# Patient Record
Sex: FEMALE | Race: ASIAN | NOT HISPANIC OR LATINO
[De-identification: names, ages, dates, MRNs, and addresses within clinical notes are randomized per-mention and may not be internally consistent; named-entity substitution may affect disease eponyms.]

---

## 2017-04-25 ENCOUNTER — TRANSCRIPTION ENCOUNTER (OUTPATIENT)
Age: 32
End: 2017-04-25

## 2021-05-13 ENCOUNTER — NON-APPOINTMENT (OUTPATIENT)
Age: 36
End: 2021-05-13

## 2021-05-13 PROBLEM — Z00.00 ENCOUNTER FOR PREVENTIVE HEALTH EXAMINATION: Status: ACTIVE | Noted: 2021-05-13

## 2021-05-20 ENCOUNTER — APPOINTMENT (OUTPATIENT)
Dept: OBGYN | Facility: CLINIC | Age: 36
End: 2021-05-20
Payer: COMMERCIAL

## 2021-05-20 VITALS
WEIGHT: 163 LBS | BODY MASS INDEX: 27.83 KG/M2 | HEART RATE: 90 BPM | SYSTOLIC BLOOD PRESSURE: 123 MMHG | HEIGHT: 64 IN | DIASTOLIC BLOOD PRESSURE: 86 MMHG

## 2021-05-20 DIAGNOSIS — Z78.9 OTHER SPECIFIED HEALTH STATUS: ICD-10-CM

## 2021-05-20 DIAGNOSIS — Z83.3 FAMILY HISTORY OF DIABETES MELLITUS: ICD-10-CM

## 2021-05-20 PROCEDURE — 99204 OFFICE O/P NEW MOD 45 MIN: CPT

## 2021-05-20 PROCEDURE — 99072 ADDL SUPL MATRL&STAF TM PHE: CPT

## 2021-05-25 NOTE — PHYSICAL EXAM
[FreeTextEntry7] : Ut ~ 28 wks [Labia Majora] : normal [Normal] : normal [FreeTextEntry6] : Ut 28 wks, bulky, mobile

## 2021-05-25 NOTE — DISCUSSION/SUMMARY
[FreeTextEntry1] : 34 yo P0 w/symptomatic ut myomas.\par Pt interested in conservative surgical intervention.\par \par Plan\par Schedule abd enoc (vertical?)

## 2021-05-25 NOTE — HISTORY OF PRESENT ILLNESS
[FreeTextEntry1] : 36 yo P0\par Pt here for eval of ut myomas.\par Pt noticed fibroids at the end of last year after noticing abd mass.\par Pt had USG and MRI showing fibroids.\par \par Pt has noticed early satiety.\par No urinary frequency, no nocturia, no const, diarrhea.\par No change in menses. No HMB\par \par Pt interested in preserving uterus.\par Here for second opinion.\par

## 2021-07-19 ENCOUNTER — OUTPATIENT (OUTPATIENT)
Dept: OUTPATIENT SERVICES | Facility: HOSPITAL | Age: 36
LOS: 1 days | End: 2021-07-19

## 2021-07-19 VITALS
HEART RATE: 76 BPM | TEMPERATURE: 97 F | OXYGEN SATURATION: 98 % | SYSTOLIC BLOOD PRESSURE: 118 MMHG | HEIGHT: 64 IN | DIASTOLIC BLOOD PRESSURE: 78 MMHG | WEIGHT: 164.91 LBS | RESPIRATION RATE: 16 BRPM

## 2021-07-19 DIAGNOSIS — D25.9 LEIOMYOMA OF UTERUS, UNSPECIFIED: ICD-10-CM

## 2021-07-19 LAB
BLD GP AB SCN SERPL QL: NEGATIVE — SIGNIFICANT CHANGE UP
HCG UR QL: NEGATIVE — SIGNIFICANT CHANGE UP
HCT VFR BLD CALC: 44.2 % — SIGNIFICANT CHANGE UP (ref 34.5–45)
HGB BLD-MCNC: 14.7 G/DL — SIGNIFICANT CHANGE UP (ref 11.5–15.5)
MCHC RBC-ENTMCNC: 29.4 PG — SIGNIFICANT CHANGE UP (ref 27–34)
MCHC RBC-ENTMCNC: 33.3 GM/DL — SIGNIFICANT CHANGE UP (ref 32–36)
MCV RBC AUTO: 88.4 FL — SIGNIFICANT CHANGE UP (ref 80–100)
NRBC # BLD: 0 /100 WBCS — SIGNIFICANT CHANGE UP
NRBC # FLD: 0 K/UL — SIGNIFICANT CHANGE UP
PLATELET # BLD AUTO: 273 K/UL — SIGNIFICANT CHANGE UP (ref 150–400)
RBC # BLD: 5 M/UL — SIGNIFICANT CHANGE UP (ref 3.8–5.2)
RBC # FLD: 12.5 % — SIGNIFICANT CHANGE UP (ref 10.3–14.5)
RH IG SCN BLD-IMP: NEGATIVE — SIGNIFICANT CHANGE UP
WBC # BLD: 7.52 K/UL — SIGNIFICANT CHANGE UP (ref 3.8–10.5)
WBC # FLD AUTO: 7.52 K/UL — SIGNIFICANT CHANGE UP (ref 3.8–10.5)

## 2021-07-19 RX ORDER — SODIUM CHLORIDE 9 MG/ML
1000 INJECTION, SOLUTION INTRAVENOUS
Refills: 0 | Status: DISCONTINUED | OUTPATIENT
Start: 2021-07-30 | End: 2021-07-30

## 2021-07-19 RX ORDER — SODIUM CHLORIDE 9 MG/ML
3 INJECTION INTRAMUSCULAR; INTRAVENOUS; SUBCUTANEOUS EVERY 8 HOURS
Refills: 0 | Status: DISCONTINUED | OUTPATIENT
Start: 2021-07-30 | End: 2021-08-01

## 2021-07-19 NOTE — H&P PST ADULT - HISTORY OF PRESENT ILLNESS
36 year old female with uterine fibroids which are causing some discomfort. Pt presents today for presurgical evaluation for .. 36 year old female with large uterine fibroids which are causing some discomfort. Pt presents today for presurgical evaluation for Abdominal Myomectomy.

## 2021-07-19 NOTE — H&P PST ADULT - NSANTHOSAYNRD_GEN_A_CORE
No. GREYSON screening performed.  STOP BANG Legend: 0-2 = LOW Risk; 3-4 = INTERMEDIATE Risk; 5-8 = HIGH Risk

## 2021-07-19 NOTE — H&P PST ADULT - NSICDXPROBLEM_GEN_ALL_CORE_FT
PROBLEM DIAGNOSES  Problem: Leiomyoma of uterus  Assessment and Plan: Pt scheduled for surgery on 7/30/2021.  Pre-op instructions provided. Pt verbalized understanding.   Pepcid provided for GI prophylaxis.   Pt given detailed verbal and written instructions on chlorhexidine wash. Pt verbalized understanding with teachback.   Pt given urine specimen cup for ucg on admission.

## 2021-07-27 ENCOUNTER — APPOINTMENT (OUTPATIENT)
Dept: OBGYN | Facility: CLINIC | Age: 36
End: 2021-07-27

## 2021-07-29 ENCOUNTER — TRANSCRIPTION ENCOUNTER (OUTPATIENT)
Age: 36
End: 2021-07-29

## 2021-07-30 ENCOUNTER — RESULT REVIEW (OUTPATIENT)
Age: 36
End: 2021-07-30

## 2021-07-30 ENCOUNTER — TRANSCRIPTION ENCOUNTER (OUTPATIENT)
Age: 36
End: 2021-07-30

## 2021-07-30 ENCOUNTER — APPOINTMENT (OUTPATIENT)
Dept: OBGYN | Facility: HOSPITAL | Age: 36
End: 2021-07-30

## 2021-07-30 ENCOUNTER — INPATIENT (INPATIENT)
Facility: HOSPITAL | Age: 36
LOS: 1 days | Discharge: HOME | End: 2021-08-01
Attending: OBSTETRICS & GYNECOLOGY | Admitting: OBSTETRICS & GYNECOLOGY
Payer: COMMERCIAL

## 2021-07-30 VITALS
HEART RATE: 74 BPM | OXYGEN SATURATION: 99 % | DIASTOLIC BLOOD PRESSURE: 75 MMHG | HEIGHT: 64 IN | SYSTOLIC BLOOD PRESSURE: 118 MMHG | WEIGHT: 164.91 LBS | TEMPERATURE: 99 F | RESPIRATION RATE: 16 BRPM

## 2021-07-30 DIAGNOSIS — D25.9 LEIOMYOMA OF UTERUS, UNSPECIFIED: ICD-10-CM

## 2021-07-30 LAB
HCG UR QL: NEGATIVE — SIGNIFICANT CHANGE UP
RH IG SCN BLD-IMP: NEGATIVE — SIGNIFICANT CHANGE UP

## 2021-07-30 PROCEDURE — 88305 TISSUE EXAM BY PATHOLOGIST: CPT | Mod: 26

## 2021-07-30 PROCEDURE — 58146 MYOMECTOMY ABDOM COMPLEX: CPT

## 2021-07-30 RX ORDER — ACETAMINOPHEN 500 MG
1000 TABLET ORAL ONCE
Refills: 0 | Status: COMPLETED | OUTPATIENT
Start: 2021-07-31 | End: 2021-07-31

## 2021-07-30 RX ORDER — OXYCODONE HYDROCHLORIDE 5 MG/1
1 TABLET ORAL
Qty: 5 | Refills: 0
Start: 2021-07-30

## 2021-07-30 RX ORDER — KETOROLAC TROMETHAMINE 30 MG/ML
30 SYRINGE (ML) INJECTION EVERY 8 HOURS
Refills: 0 | Status: DISCONTINUED | OUTPATIENT
Start: 2021-07-30 | End: 2021-07-31

## 2021-07-30 RX ORDER — ACETAMINOPHEN 500 MG
1000 TABLET ORAL EVERY 6 HOURS
Refills: 0 | Status: DISCONTINUED | OUTPATIENT
Start: 2021-07-30 | End: 2021-07-31

## 2021-07-30 RX ORDER — ACETAMINOPHEN 500 MG
1000 TABLET ORAL ONCE
Refills: 0 | Status: COMPLETED | OUTPATIENT
Start: 2021-07-30 | End: 2021-07-31

## 2021-07-30 RX ORDER — ONDANSETRON 8 MG/1
4 TABLET, FILM COATED ORAL ONCE
Refills: 0 | Status: COMPLETED | OUTPATIENT
Start: 2021-07-30 | End: 2021-07-31

## 2021-07-30 RX ORDER — ONDANSETRON 8 MG/1
8 TABLET, FILM COATED ORAL EVERY 8 HOURS
Refills: 0 | Status: DISCONTINUED | OUTPATIENT
Start: 2021-07-30 | End: 2021-08-01

## 2021-07-30 RX ORDER — HYDROMORPHONE HYDROCHLORIDE 2 MG/ML
0.5 INJECTION INTRAMUSCULAR; INTRAVENOUS; SUBCUTANEOUS
Refills: 0 | Status: DISCONTINUED | OUTPATIENT
Start: 2021-07-30 | End: 2021-07-31

## 2021-07-30 RX ORDER — SODIUM CHLORIDE 9 MG/ML
1000 INJECTION, SOLUTION INTRAVENOUS
Refills: 0 | Status: DISCONTINUED | OUTPATIENT
Start: 2021-07-30 | End: 2021-07-31

## 2021-07-30 RX ORDER — IBUPROFEN 200 MG
1 TABLET ORAL
Qty: 0 | Refills: 0 | DISCHARGE

## 2021-07-30 RX ORDER — ACETAMINOPHEN 500 MG
3 TABLET ORAL
Qty: 0 | Refills: 0 | DISCHARGE

## 2021-07-30 RX ORDER — OXYCODONE HYDROCHLORIDE 5 MG/1
5 TABLET ORAL ONCE
Refills: 0 | Status: DISCONTINUED | OUTPATIENT
Start: 2021-07-30 | End: 2021-07-31

## 2021-07-30 RX ORDER — SIMETHICONE 80 MG/1
80 TABLET, CHEWABLE ORAL EVERY 6 HOURS
Refills: 0 | Status: DISCONTINUED | OUTPATIENT
Start: 2021-07-30 | End: 2021-08-01

## 2021-07-30 RX ADMIN — SODIUM CHLORIDE 125 MILLILITER(S): 9 INJECTION, SOLUTION INTRAVENOUS at 21:25

## 2021-07-30 RX ADMIN — SODIUM CHLORIDE 3 MILLILITER(S): 9 INJECTION INTRAMUSCULAR; INTRAVENOUS; SUBCUTANEOUS at 21:48

## 2021-07-30 RX ADMIN — SODIUM CHLORIDE 30 MILLILITER(S): 9 INJECTION, SOLUTION INTRAVENOUS at 13:48

## 2021-07-30 RX ADMIN — HYDROMORPHONE HYDROCHLORIDE 0.5 MILLIGRAM(S): 2 INJECTION INTRAMUSCULAR; INTRAVENOUS; SUBCUTANEOUS at 22:36

## 2021-07-30 RX ADMIN — HYDROMORPHONE HYDROCHLORIDE 0.5 MILLIGRAM(S): 2 INJECTION INTRAMUSCULAR; INTRAVENOUS; SUBCUTANEOUS at 23:00

## 2021-07-30 NOTE — DISCHARGE NOTE PROVIDER - NSDCFUSCHEDAPPT_GEN_ALL_CORE_FT
ANGEL MILNER ; 07/30/2021 ; [facility] Surg [address]  ANGEL MILNER ; 08/26/2021 ; [facility] [address] [facility] ANGEL MILNER ; 08/26/2021 ; [facility] [id number] [facility]

## 2021-07-30 NOTE — ASU PREOP CHECKLIST - ORDERS/MEDICATION ADMINISTRATION RECORD ON CHART
Woke up pt to fix teley leads patient woke up very confused, combative, yelling in Cambodian and attempting to rip lines and drains out. Call daughter costa. Pt talked to daughter seemed to calm her down. MD camarena gave order for ativan and haldol prn. Agatation improved with ativan and was cooperative while daughter came in to calm her down. Will continue to monitor.    done

## 2021-07-30 NOTE — BRIEF OPERATIVE NOTE - NSICDXBRIEFPROCEDURE_GEN_ALL_CORE_FT
PROCEDURES:  Myomectomy, uterus, 5 or more leiomyomata, abdominal approach, total weight greater than 250 grams 30-Jul-2021 20:40:50  Tena Guardado

## 2021-07-30 NOTE — DISCHARGE NOTE PROVIDER - CARE PROVIDER_API CALL
Ezio Walter  OBSTETRICS AND GYNECOLOGY  [address] [place] [address]  Phone: [phone]  Fax: [phone]  Established Patient  Follow Up Time: 2 weeks

## 2021-07-30 NOTE — BRIEF OPERATIVE NOTE - OPERATION/FINDINGS
EUA: approximately 24 week sized bulky mobile fibroid uterus, adnexa non-enlarged  Laparotomy: globular fibroid uterus with dominant 17cm sized left lateral subserosal fibroids; multiple additional subserosal fibroids: 4cm left anterior, 3cm right anterior and multiple <1cm anterior subserosal fibroids. Dominant fibroid displacing uterus to the right and anteriorly. Normal appearing b/l fallopian tubes and ovaries.  normal appearing upper abdomen: smooth liver edge, no bowel irregularities or adhesions EUA: approximately 24 week sized bulky mobile fibroid uterus, adnexa non-enlarged  Laparotomy: globular fibroid uterus with dominant 17cm sized left lateral subserosal fibroid; multiple additional subserosal fibroids: 4cm left anterior, 3cm right anterior and multiple <1cm anterior subserosal fibroids. Dominant fibroid displacing uterus to the right and anteriorly. Normal appearing b/l fallopian tubes and ovaries.  normal appearing upper abdomen: smooth liver edge, no bowel irregularities or adhesions

## 2021-07-30 NOTE — DISCHARGE NOTE PROVIDER - HOSPITAL COURSE
37yo who presented on 7/30 for abdominal myomectomy for fibroid uterus. Pt underwent surgery without any complications. Pt underwent Ex-Lap, Abdominal Myomectomy (). Please see operative report for details of the case. On POD#1 cordova was discontinued and patient was able to void successfully. Pt tolerated regular diet and ambulated without difficulty. CBC was stable. On POD#2 pt was able to pass flatus. Pt was discharged on POD@2 in stable condition: ambulating, voiding, tolerating regular diet with pain well controlled. She will have close follow-up with Dr. Walter

## 2021-07-30 NOTE — DISCHARGE NOTE PROVIDER - NSDCFUADDINST_GEN_ALL_CORE_FT
Follow up with Dr. Walter. Take pain medications as instructed. Call your doctor if you experience fevers >100.4, pain uncontrolled with medications, heavy vaginal bleeding, or inability to void. Return to your regular way of eating.  Resume normal activity as tolerated, but no heavy lifting or strenuous activity for 2 weeks.  No driving for next 2 weeks and/or while on narcotic pain medication.  Complete vaginal rest, no tampons, no douching, no tub bathing, no sexual activities for 2 weeks unless otherwise instructed by your doctor.  Call your doctor with any signs and symptoms of infection such as fever, chills, nausea or vomiting.  Call your doctor with redness or swelling at the incision site, fluid leakage or wound separation.  Call your doctor if you're unable to tolerate food or have difficulty urinating.  Call your doctor if you have pain that is not relieved by your prescribed medications.  Notify your doctor with any other concerns.  Follow up with Dr. Walter in 2 weeks

## 2021-07-30 NOTE — CHART NOTE - NSCHARTNOTEFT_GEN_A_CORE
R2 GYN POST-OP CHECK    S: Patient seen and evaluated at bedside.  Pt awake and alert resting comfortably in bed. Patient reports pain controlled with analgesia. Pt denies N/V, SOB, CP, palpitations, fever/chills. Tolerating clears.  Not OOB yet.    O:   T(C): 36.7 (07-30-21 @ 20:55), Max: 36.7 (07-30-21 @ 20:55)  HR: 84 (07-30-21 @ 22:15) (79 - 84)  BP: 126/85 (07-30-21 @ 22:15) (126/74 - 135/85)  RR: 20 (07-30-21 @ 22:15) (13 - 24)  SpO2: 94% (07-30-21 @ 22:15) (94% - 100%)  Wt(kg): --  I&O's Summary      Gen: Resting comfortably in bed, NAD  CV: S1S2, RRR  Lungs: CTA B/L  Abd: soft, appropriately tender, occasional BS x 4 quadrants.    Inc: Pfannenstiel Clean/dry/intact w/ dermabond  Ext: SCD's in place and functional, non-tender b/l, no edema    Labs:      MEDICATIONS  (STANDING):  acetaminophen   Tablet .. 1000 milliGRAM(s) Oral every 6 hours  acetaminophen  IVPB .. 1000 milliGRAM(s) IV Intermittent once  ketorolac   Injectable 30 milliGRAM(s) IV Push every 8 hours  lactated ringers. 1000 milliLiter(s) (125 mL/Hr) IV Continuous <Continuous>  sodium chloride 0.9% lock flush 3 milliLiter(s) IV Push every 8 hours    MEDICATIONS  (PRN):  HYDROmorphone  Injectable 0.5 milliGRAM(s) IV Push every 10 minutes PRN Severe Pain (7 - 10)  ondansetron    Tablet 8 milliGRAM(s) Oral every 8 hours PRN Nausea and/or Vomiting  ondansetron Injectable 4 milliGRAM(s) IV Push once PRN Nausea and/or Vomiting  oxyCODONE    IR 5 milliGRAM(s) Oral once PRN Moderate Pain (4 - 6)  simethicone 80 milliGRAM(s) Chew every 6 hours PRN Gas        A/P: 36y Female s/p ex-lap, abdominal myomectomy  stable in the PACU    Neuro: PO Analgesia PRN     CV: Hemodynamically stable.  Monitor VS. CBC in AM.   Pulm: Saturating well on room air.  Encourage OOB and incentive spirometer use.   GI: Advance to regular diet. Anti-emetics PRN.  : Riley to gravity.    FEN: Electrolytes: LR@125cc/hr.    Heme: DVT ppx w/ SCD's while in bed. Early ambulation, initially with assistance then as tolerated.    ID: Afebrile  Endo: No active issues   Dispo: Discharge from PACU when criteria met.     d/w Dr. Walter and Dr. Morgan Porter, PGY2

## 2021-07-30 NOTE — DISCHARGE NOTE PROVIDER - NSDCMRMEDTOKEN_GEN_ALL_CORE_FT
oxyCODONE 5 mg oral tablet: 1 tab(s) orally every 6 hours, As Needed -for severe pain MDD:4    ibuprofen 600 mg oral tablet: 1 tab(s) orally every 6 hours  oxyCODONE 5 mg oral tablet: 1 tab(s) orally every 6 hours, As Needed -for severe pain MDD:4   Tylenol 325 mg oral capsule: 3 cap(s) orally every 6 hours

## 2021-07-31 LAB
HCT VFR BLD CALC: 39.5 % — SIGNIFICANT CHANGE UP (ref 34.5–45)
HGB BLD-MCNC: 13.2 G/DL — SIGNIFICANT CHANGE UP (ref 11.5–15.5)
MCHC RBC-ENTMCNC: 29.4 PG — SIGNIFICANT CHANGE UP (ref 27–34)
MCHC RBC-ENTMCNC: 33.4 GM/DL — SIGNIFICANT CHANGE UP (ref 32–36)
MCV RBC AUTO: 88 FL — SIGNIFICANT CHANGE UP (ref 80–100)
NRBC # BLD: 0 /100 WBCS — SIGNIFICANT CHANGE UP
NRBC # FLD: 0 K/UL — SIGNIFICANT CHANGE UP
PLATELET # BLD AUTO: 281 K/UL — SIGNIFICANT CHANGE UP (ref 150–400)
RBC # BLD: 4.49 M/UL — SIGNIFICANT CHANGE UP (ref 3.8–5.2)
RBC # FLD: 12.4 % — SIGNIFICANT CHANGE UP (ref 10.3–14.5)
WBC # BLD: 18.02 K/UL — HIGH (ref 3.8–10.5)
WBC # FLD AUTO: 18.02 K/UL — HIGH (ref 3.8–10.5)

## 2021-07-31 RX ORDER — KETOROLAC TROMETHAMINE 30 MG/ML
30 SYRINGE (ML) INJECTION EVERY 6 HOURS
Refills: 0 | Status: DISCONTINUED | OUTPATIENT
Start: 2021-07-31 | End: 2021-08-01

## 2021-07-31 RX ORDER — FAMOTIDINE 10 MG/ML
20 INJECTION INTRAVENOUS ONCE
Refills: 0 | Status: COMPLETED | OUTPATIENT
Start: 2021-07-31 | End: 2021-07-31

## 2021-07-31 RX ORDER — OXYCODONE HYDROCHLORIDE 5 MG/1
5 TABLET ORAL ONCE
Refills: 0 | Status: DISCONTINUED | OUTPATIENT
Start: 2021-07-31 | End: 2021-07-31

## 2021-07-31 RX ORDER — ACETAMINOPHEN 500 MG
975 TABLET ORAL EVERY 6 HOURS
Refills: 0 | Status: DISCONTINUED | OUTPATIENT
Start: 2021-07-31 | End: 2021-08-01

## 2021-07-31 RX ADMIN — Medication 400 MILLIGRAM(S): at 00:57

## 2021-07-31 RX ADMIN — ONDANSETRON 4 MILLIGRAM(S): 8 TABLET, FILM COATED ORAL at 01:35

## 2021-07-31 RX ADMIN — Medication 30 MILLIGRAM(S): at 03:26

## 2021-07-31 RX ADMIN — Medication 975 MILLIGRAM(S): at 19:06

## 2021-07-31 RX ADMIN — Medication 400 MILLIGRAM(S): at 06:32

## 2021-07-31 RX ADMIN — FAMOTIDINE 20 MILLIGRAM(S): 10 INJECTION INTRAVENOUS at 06:33

## 2021-07-31 RX ADMIN — Medication 400 MILLIGRAM(S): at 12:02

## 2021-07-31 RX ADMIN — OXYCODONE HYDROCHLORIDE 5 MILLIGRAM(S): 5 TABLET ORAL at 04:45

## 2021-07-31 RX ADMIN — OXYCODONE HYDROCHLORIDE 5 MILLIGRAM(S): 5 TABLET ORAL at 04:04

## 2021-07-31 RX ADMIN — SODIUM CHLORIDE 3 MILLILITER(S): 9 INJECTION INTRAMUSCULAR; INTRAVENOUS; SUBCUTANEOUS at 06:28

## 2021-07-31 RX ADMIN — SODIUM CHLORIDE 3 MILLILITER(S): 9 INJECTION INTRAMUSCULAR; INTRAVENOUS; SUBCUTANEOUS at 21:03

## 2021-07-31 RX ADMIN — SODIUM CHLORIDE 3 MILLILITER(S): 9 INJECTION INTRAMUSCULAR; INTRAVENOUS; SUBCUTANEOUS at 12:11

## 2021-07-31 RX ADMIN — SODIUM CHLORIDE 125 MILLILITER(S): 9 INJECTION, SOLUTION INTRAVENOUS at 03:26

## 2021-07-31 NOTE — PROGRESS NOTE ADULT - SUBJECTIVE AND OBJECTIVE BOX
R1 GYN Progress Note     Patient seen and examined at bedside.  No acute events overnight. No acute complaints.  Pain well controlled.  Patient is ambulating and tolerating.....   Has not yet passed flatus vs. Patient is passing flatus.    Patient is voiding spontaneously vs. Riley is still in place.   Denies CP, SOB, N/V, fevers, and chills.    Vital Signs Last 24 Hours  T(C): 36.7 (07-31-21 @ 03:48), Max: 37.1 (07-30-21 @ 13:16)  HR: 76 (07-31-21 @ 03:48) (74 - 89)  BP: 130/79 (07-31-21 @ 03:48) (118/75 - 135/85)  RR: 24 (07-31-21 @ 03:48) (13 - 24)  SpO2: 99% (07-31-21 @ 03:48) (94% - 100%)    I&O's Summary    30 Jul 2021 07:01  -  31 Jul 2021 04:51  --------------------------------------------------------  IN: 435 mL / OUT: 320 mL / NET: 115 mL        Physical Exam:  General: NAD  CV: RRR  Lungs: CTA b/l, good air flow b/l   Abdomen: Soft, mildly-tender to palpation diffusely, softly distended, normoactive bowel sounds  Incision:  Pfannenstiel Clean/dry/intact w/ dermabond  Ext: No pain or swelling     Labs:      MEDICATIONS  (STANDING):  acetaminophen   Tablet .. 1000 milliGRAM(s) Oral every 6 hours  acetaminophen  IVPB .. 1000 milliGRAM(s) IV Intermittent once  acetaminophen  IVPB .. 1000 milliGRAM(s) IV Intermittent once  famotidine Injectable 20 milliGRAM(s) IV Push once  ketorolac   Injectable 30 milliGRAM(s) IV Push every 8 hours  lactated ringers. 1000 milliLiter(s) (125 mL/Hr) IV Continuous <Continuous>  sodium chloride 0.9% lock flush 3 milliLiter(s) IV Push every 8 hours    MEDICATIONS  (PRN):  ondansetron    Tablet 8 milliGRAM(s) Oral every 8 hours PRN Nausea and/or Vomiting  simethicone 80 milliGRAM(s) Chew every 6 hours PRN Gas       R1 GYN Progress Note     Patient seen and examined at bedside.  No acute events overnight. No acute complaints.  Pain well controlled.  Patient is ambulating and tolerating clears  Has not yet passed flatus   Riley is still in place.   Denies CP, SOB, N/V, fevers, and chills.    Vital Signs Last 24 Hours  T(C): 36.7 (07-31-21 @ 03:48), Max: 37.1 (07-30-21 @ 13:16)  HR: 76 (07-31-21 @ 03:48) (74 - 89)  BP: 130/79 (07-31-21 @ 03:48) (118/75 - 135/85)  RR: 24 (07-31-21 @ 03:48) (13 - 24)  SpO2: 99% (07-31-21 @ 03:48) (94% - 100%)    I&O's Summary    30 Jul 2021 07:01  -  31 Jul 2021 04:51  --------------------------------------------------------  IN: 435 mL / OUT: 320 mL / NET: 115 mL        Physical Exam:  General: NAD  CV: RRR  Lungs: CTA b/l, good air flow b/l   Abdomen: Soft, mildly-tender to palpation diffusely, softly distended, normoactive bowel sounds  Incision:  Pfannenstiel Clean/dry/intact w/ dermabond  Ext: No pain or swelling     Labs:      MEDICATIONS  (STANDING):  acetaminophen   Tablet .. 1000 milliGRAM(s) Oral every 6 hours  acetaminophen  IVPB .. 1000 milliGRAM(s) IV Intermittent once  acetaminophen  IVPB .. 1000 milliGRAM(s) IV Intermittent once  famotidine Injectable 20 milliGRAM(s) IV Push once  ketorolac   Injectable 30 milliGRAM(s) IV Push every 8 hours  lactated ringers. 1000 milliLiter(s) (125 mL/Hr) IV Continuous <Continuous>  sodium chloride 0.9% lock flush 3 milliLiter(s) IV Push every 8 hours    MEDICATIONS  (PRN):  ondansetron    Tablet 8 milliGRAM(s) Oral every 8 hours PRN Nausea and/or Vomiting  simethicone 80 milliGRAM(s) Chew every 6 hours PRN Gas       R2 GYN Progress Note     Patient seen and examined at bedside.  No acute events overnight. No acute complaints.  Pain well controlled.  Patient is ambulating and tolerating clears  Has not yet passed flatus   Riley is still in place.   Denies CP, SOB, N/V, fevers, and chills.    Vital Signs Last 24 Hours  T(C): 36.7 (07-31-21 @ 03:48), Max: 37.1 (07-30-21 @ 13:16)  HR: 76 (07-31-21 @ 03:48) (74 - 89)  BP: 130/79 (07-31-21 @ 03:48) (118/75 - 135/85)  RR: 24 (07-31-21 @ 03:48) (13 - 24)  SpO2: 99% (07-31-21 @ 03:48) (94% - 100%)    I&O's Summary    30 Jul 2021 07:01  -  31 Jul 2021 04:51  --------------------------------------------------------  IN: 435 mL / OUT: 320 mL / NET: 115 mL        Physical Exam:  General: NAD  CV: RRR  Lungs: CTA b/l, good air flow b/l   Abdomen: Soft, mildly-tender to palpation diffusely, softly distended, normoactive bowel sounds  Incision:  Pfannenstiel Clean/dry/intact w/ dermabond  Ext: No pain or swelling     Labs:      MEDICATIONS  (STANDING):  acetaminophen   Tablet .. 1000 milliGRAM(s) Oral every 6 hours  acetaminophen  IVPB .. 1000 milliGRAM(s) IV Intermittent once  acetaminophen  IVPB .. 1000 milliGRAM(s) IV Intermittent once  famotidine Injectable 20 milliGRAM(s) IV Push once  ketorolac   Injectable 30 milliGRAM(s) IV Push every 8 hours  lactated ringers. 1000 milliLiter(s) (125 mL/Hr) IV Continuous <Continuous>  sodium chloride 0.9% lock flush 3 milliLiter(s) IV Push every 8 hours    MEDICATIONS  (PRN):  ondansetron    Tablet 8 milliGRAM(s) Oral every 8 hours PRN Nausea and/or Vomiting  simethicone 80 milliGRAM(s) Chew every 6 hours PRN Gas

## 2021-07-31 NOTE — PROVIDER CONTACT NOTE (OTHER) - ASSESSMENT
Patient complains of 8/10 pain. Vital sign stable except RR 24. See flowsheet for details. Patient complains of chest tightness.

## 2021-07-31 NOTE — PROGRESS NOTE ADULT - ASSESSMENT
36y F POD#1 s/p ex-lap, abdominal myomectomy. . Pt beginning to meet postoperative milestones with no acute concerns.     Neuro: Pain well controlled on IV Tylenol and Toradol      CV: Hemodynamically stable.  Monitor VS. f/u AM CBC   Pulm: Saturating well on room air.  Encourage OOB and incentive spirometer use.    GI: Regular diet. Anti-emetics PRN.   : Cordova in place. Adequate UOP. D/C cordova.   Heme: Continue HSQ/Venodynes for DVT ppx. Increase OOB.   FEN: LR@125. f/u AM BMP, Mg, Phos. Replete electrolytes PRN     ID: afebrile   Endo: no active issues  Dispo: continue routine postoperative care     Roldan PGY2 36y F POD#1 s/p ex-lap, abdominal myomectomy. . Pt beginning to meet postoperative milestones with no acute concerns.     Neuro: Pain well controlled on IV Tylenol and Toradol      CV: Hemodynamically stable.  Monitor VS. f/u AM CBC   Pulm: Saturating well on room air.  Encourage OOB and incentive spirometer use.    GI: Regular diet. Anti-emetics PRN.   : Cordova in place. Adequate UOP. D/C cordova.   Heme: Continue Venodynes for DVT ppx. Increase OOB.   FEN: LR@125. f/u AM CBC     ID: afebrile   Endo: no active issues  Dispo: continue routine postoperative care     Roldan PGY2      Fellow Note    Pt evaluated at bedside. No acute overnight events. Pt is now POD#1 s/p Abd myomectomy (EBL=250ml) Pt has met all appropriate post-op milestones. She is tolerating a reg diet. Cordova catheter was removed and now pt is voiding spontaneously. Pt required 5mg oxycodone x1 overnight- otherwise pain controlled with standing acetaminophen and toradol. Patient denies chest pain, shortness of breath, fevers, chills, nausea, vomiting, diarrhea. Pfannenstiel incision is c/d/i.    -AM CBC reviewed: 13.2/39.5. WBC noted to be 18, however pt is recently post-op. Pt has been afebrile  -Reg diet as tolerated. Will transition to po medications after pt able to tolerating meals  -Venodyne and ambulation for DVT prophylaxis. Pt has been ambulating numerous times this AM.   -Routine post-op care    Jolene, pgy5

## 2021-08-01 ENCOUNTER — TRANSCRIPTION ENCOUNTER (OUTPATIENT)
Age: 36
End: 2021-08-01

## 2021-08-01 VITALS
OXYGEN SATURATION: 100 % | RESPIRATION RATE: 18 BRPM | HEART RATE: 80 BPM | TEMPERATURE: 98 F | SYSTOLIC BLOOD PRESSURE: 100 MMHG | DIASTOLIC BLOOD PRESSURE: 67 MMHG

## 2021-08-01 RX ORDER — IBUPROFEN 200 MG
600 TABLET ORAL EVERY 6 HOURS
Refills: 0 | Status: DISCONTINUED | OUTPATIENT
Start: 2021-08-01 | End: 2021-08-01

## 2021-08-01 RX ADMIN — Medication 975 MILLIGRAM(S): at 01:00

## 2021-08-01 RX ADMIN — Medication 975 MILLIGRAM(S): at 07:06

## 2021-08-01 RX ADMIN — SODIUM CHLORIDE 3 MILLILITER(S): 9 INJECTION INTRAMUSCULAR; INTRAVENOUS; SUBCUTANEOUS at 07:15

## 2021-08-01 NOTE — DISCHARGE NOTE NURSING/CASE MANAGEMENT/SOCIAL WORK - PATIENT PORTAL LINK FT
You can access the FollowMyHealth Patient Portal offered by [facility] by registering at the following website: [email or web address]. By joining [facility]’s FollowMyHealth portal, you will also be able to view your health information using other applications (apps) compatible with our system.

## 2021-08-01 NOTE — PROGRESS NOTE ADULT - ASSESSMENT
36y F POD#2 s/p ex-lap, abdominal myomectomy. . Pt beginning to meet postoperative milestones with no acute concerns.     Neuro: Pain well controlled on PO Tylenol and Toradol      CV: Hemodynamically stable.  H/H on yesterdays labs appropriate. Monitor VS. No labs in AM.  Pulm: Saturating well on room air.  Encourage OOB and incentive spirometer use.    GI: Regular diet. Anti-emetics PRN.   : Voiding spontaneously. Adequate UOP.   Heme: Continue Venodynes for DVT ppx. Increase OOB.   FEN: SLIV  ID: afebrile   Endo: no active issues  Dispo: continue routine postoperative care     Roldan PGY2       36y F POD#2 s/p ex-lap, abdominal myomectomy. . Pt meeting postoperative milestones with no acute concerns.     Neuro: Pain well controlled on PO Tylenol and Toradol      CV: Hemodynamically stable.  H/H on yesterdays labs appropriate. Monitor VS. No labs in AM.  Pulm: Saturating well on room air.  Encourage OOB and incentive spirometer use.    GI: Regular diet. Anti-emetics PRN.   : Voiding spontaneously. Adequate UOP.   Heme: Continue Venodynes for DVT ppx. Increase OOB.   FEN: SLIV  ID: afebrile   Endo: no active issues  Dispo: continue routine postoperative care     Roldan PGY2       36y F POD#2 s/p ex-lap, abdominal myomectomy. . Pt meeting postoperative milestones with no acute concerns.     Neuro: Pain well controlled on PO Tylenol and Toradol. Will transition to Motrin today      CV: Hemodynamically stable.  H/H on yesterdays labs appropriate. Monitor VS. No labs in AM.  Pulm: Saturating well on room air.  Encourage OOB and incentive spirometer use.    GI: Regular diet. Anti-emetics PRN.   : Voiding spontaneously. Adequate UOP.   Heme: Continue Venodynes for DVT ppx. Increase OOB.   FEN: SLIV  ID: afebrile   Endo: no active issues  Dispo: discharge planning    Roldan PGY2       36y F POD#2 s/p ex-lap, abdominal myomectomy. . Pt meeting postoperative milestones with no acute concerns.     Neuro: Pain well controlled on PO Tylenol and Toradol. Will transition to Motrin today      CV: Hemodynamically stable.  H/H on yesterdays labs appropriate. Monitor VS. No labs in AM.  Pulm: Saturating well on room air.  Encourage OOB and incentive spirometer use.    GI: Regular diet. Anti-emetics PRN.   : Voiding spontaneously. Adequate UOP.   Heme: Continue Venodynes for DVT ppx. Increase OOB.   FEN: SLIV  ID: afebrile   Endo: no active issues  Dispo: discharge planning    Roldan PGY2    Fellow Note  Pt evaluated at bedside with Dr. Ortiz. No acute overnight events. Pt is now POD#2 s/p abd myomectomy. Pt has met all appropriate post-op milestones. Pt tolerating reg diet. Passing flatus. Voiding spontaneously. Pt ambulating in hallways.  Patient denies headaches, blurry vision, lightheadness, dizziness, CP, SOB, fevers, chills, nausea, vomiting, diarrhea, constipation. Incision c/d/i. Pain well controlled with po pain medications. Pt to be discharged later today.   Jolene, pgy-5

## 2021-08-01 NOTE — PROGRESS NOTE ADULT - SUBJECTIVE AND OBJECTIVE BOX
R2 GYN Progress Note     Patient seen and examined at bedside.  No acute events overnight. No acute complaints.  Pain well controlled.  Patient is ambulating and tolerating regular diet  Has not yet passed flatus. Voiding spontaneously  Denies CP, SOB, N/V, fevers, and chills.    Vital Signs Last 24 Hrs  T(C): 36.8 (01 Aug 2021 02:00), Max: 37.1 (31 Jul 2021 14:09)  T(F): 98.3 (01 Aug 2021 02:00), Max: 98.7 (31 Jul 2021 14:09)  HR: 80 (01 Aug 2021 02:00) (75 - 92)  BP: 100/60 (01 Aug 2021 02:00) (100/60 - 130/79)  BP(mean): --  RR: 18 (01 Aug 2021 02:00) (18 - 24)  SpO2: 96% (01 Aug 2021 02:00) (95% - 100%)      Physical Exam:  General: NAD  CV: RRR  Lungs: CTA b/l, good air flow b/l   Abdomen: Soft, mildly-tender to palpation diffusely, softly distended, normoactive bowel sounds  Incision:  Pfannenstiel Clean/dry/intact w/ dermabond  Ext: No pain or swelling     Labs:      MEDICATIONS  (STANDING):  acetaminophen   Tablet .. 1000 milliGRAM(s) Oral every 6 hours  acetaminophen  IVPB .. 1000 milliGRAM(s) IV Intermittent once  acetaminophen  IVPB .. 1000 milliGRAM(s) IV Intermittent once  famotidine Injectable 20 milliGRAM(s) IV Push once  ketorolac   Injectable 30 milliGRAM(s) IV Push every 8 hours  lactated ringers. 1000 milliLiter(s) (125 mL/Hr) IV Continuous <Continuous>  sodium chloride 0.9% lock flush 3 milliLiter(s) IV Push every 8 hours    MEDICATIONS  (PRN):  ondansetron    Tablet 8 milliGRAM(s) Oral every 8 hours PRN Nausea and/or Vomiting  simethicone 80 milliGRAM(s) Chew every 6 hours PRN Gas       R2 GYN Progress Note     Patient seen and examined at bedside.  No acute events overnight. No acute complaints.  Pain well controlled.  Patient is ambulating and tolerating regular diet  Passing flatus. Voiding spontaneously  Denies CP, SOB, N/V, fevers, and chills.    Vital Signs Last 24 Hrs  T(C): 36.8 (01 Aug 2021 02:00), Max: 37.1 (31 Jul 2021 14:09)  T(F): 98.3 (01 Aug 2021 02:00), Max: 98.7 (31 Jul 2021 14:09)  HR: 80 (01 Aug 2021 02:00) (75 - 92)  BP: 100/60 (01 Aug 2021 02:00) (100/60 - 130/79)  BP(mean): --  RR: 18 (01 Aug 2021 02:00) (18 - 24)  SpO2: 96% (01 Aug 2021 02:00) (95% - 100%)      Physical Exam:  General: NAD  CV: RRR  Lungs: CTA b/l, good air flow b/l   Abdomen: Soft, mildly-tender to palpation diffusely, softly distended, normoactive bowel sounds  Incision:  Pfannenstiel Clean/dry/intact w/ dermabond  Ext: No pain or swelling     Labs:      MEDICATIONS  (STANDING):  acetaminophen   Tablet .. 1000 milliGRAM(s) Oral every 6 hours  acetaminophen  IVPB .. 1000 milliGRAM(s) IV Intermittent once  acetaminophen  IVPB .. 1000 milliGRAM(s) IV Intermittent once  famotidine Injectable 20 milliGRAM(s) IV Push once  ketorolac   Injectable 30 milliGRAM(s) IV Push every 8 hours  lactated ringers. 1000 milliLiter(s) (125 mL/Hr) IV Continuous <Continuous>  sodium chloride 0.9% lock flush 3 milliLiter(s) IV Push every 8 hours    MEDICATIONS  (PRN):  ondansetron    Tablet 8 milliGRAM(s) Oral every 8 hours PRN Nausea and/or Vomiting  simethicone 80 milliGRAM(s) Chew every 6 hours PRN Gas

## 2021-08-16 ENCOUNTER — TRANSCRIPTION ENCOUNTER (OUTPATIENT)
Age: 36
End: 2021-08-16

## 2021-08-26 ENCOUNTER — APPOINTMENT (OUTPATIENT)
Dept: OBGYN | Facility: CLINIC | Age: 36
End: 2021-08-26

## 2021-08-26 VITALS
DIASTOLIC BLOOD PRESSURE: 76 MMHG | TEMPERATURE: 98 F | SYSTOLIC BLOOD PRESSURE: 112 MMHG | HEART RATE: 64 BPM | BODY MASS INDEX: 27.14 KG/M2 | WEIGHT: 159 LBS | HEIGHT: 64 IN

## 2021-08-26 DIAGNOSIS — D25.9 LEIOMYOMA OF UTERUS, UNSPECIFIED: ICD-10-CM

## 2021-08-27 PROBLEM — D25.9 LEIOMYOMA OF UTERUS, UNSPECIFIED: Chronic | Status: ACTIVE | Noted: 2021-07-19

## 2021-08-27 PROBLEM — M94.0 CHONDROCOSTAL JUNCTION SYNDROME [TIETZE]: Chronic | Status: ACTIVE | Noted: 2021-07-30

## 2021-08-31 PROBLEM — D25.9 FIBROID, UTERINE: Status: ACTIVE | Noted: 2021-05-20

## 2021-08-31 NOTE — DISCUSSION/SUMMARY
[FreeTextEntry1] : 37 yo here for PO check after abd enoc 21.\par Pt recovering well.\par Path pending.\par \par Plan\par Routine f/u prn.\par  for delivery.

## 2021-09-13 LAB — SURGICAL PATHOLOGY STUDY: SIGNIFICANT CHANGE UP

## 2021-12-14 ENCOUNTER — NON-APPOINTMENT (OUTPATIENT)
Age: 36
End: 2021-12-14

## 2021-12-15 ENCOUNTER — APPOINTMENT (OUTPATIENT)
Dept: OBGYN | Facility: CLINIC | Age: 36
End: 2021-12-15
Payer: COMMERCIAL

## 2021-12-15 VITALS
DIASTOLIC BLOOD PRESSURE: 78 MMHG | HEART RATE: 76 BPM | WEIGHT: 160 LBS | HEIGHT: 64 IN | BODY MASS INDEX: 27.31 KG/M2 | SYSTOLIC BLOOD PRESSURE: 128 MMHG

## 2021-12-15 PROCEDURE — 99395 PREV VISIT EST AGE 18-39: CPT

## 2022-12-20 ENCOUNTER — APPOINTMENT (OUTPATIENT)
Dept: OBGYN | Facility: CLINIC | Age: 37
End: 2022-12-20

## 2022-12-20 VITALS
WEIGHT: 166 LBS | HEIGHT: 64 IN | HEART RATE: 79 BPM | DIASTOLIC BLOOD PRESSURE: 84 MMHG | SYSTOLIC BLOOD PRESSURE: 129 MMHG | BODY MASS INDEX: 28.34 KG/M2

## 2022-12-20 PROCEDURE — 99395 PREV VISIT EST AGE 18-39: CPT

## 2023-02-17 LAB
CYTOLOGY CVX/VAG DOC THIN PREP: NORMAL
HPV HIGH+LOW RISK DNA PNL CVX: NOT DETECTED

## 2023-11-16 NOTE — PATIENT PROFILE ADULT - BRAND OF COVID-19 VACCINATION
Pfizer dose 1 and 2
Detail Level: Detailed
Quality 110: Preventive Care And Screening: Influenza Immunization: Influenza Immunization not Administered because Patient Refused.

## 2023-12-20 ENCOUNTER — NON-APPOINTMENT (OUTPATIENT)
Age: 38
End: 2023-12-20

## 2023-12-21 ENCOUNTER — APPOINTMENT (OUTPATIENT)
Dept: OBGYN | Facility: CLINIC | Age: 38
End: 2023-12-21
Payer: COMMERCIAL

## 2023-12-21 VITALS
SYSTOLIC BLOOD PRESSURE: 132 MMHG | DIASTOLIC BLOOD PRESSURE: 82 MMHG | WEIGHT: 172 LBS | HEIGHT: 64 IN | HEART RATE: 89 BPM | BODY MASS INDEX: 29.37 KG/M2

## 2023-12-21 DIAGNOSIS — Z80.7 FAMILY HISTORY OF OTHER MALIGNANT NEOPLASMS OF LYMPHOID, HEMATOPOIETIC AND RELATED TISSUES: ICD-10-CM

## 2023-12-21 DIAGNOSIS — Z01.419 ENCOUNTER FOR GYNECOLOGICAL EXAMINATION (GENERAL) (ROUTINE) W/OUT ABNORMAL FINDINGS: ICD-10-CM

## 2023-12-21 PROCEDURE — 99395 PREV VISIT EST AGE 18-39: CPT

## 2023-12-21 NOTE — HISTORY OF PRESENT ILLNESS
[No] : Patient does not have concerns regarding sex [Never active] : never active [FreeTextEntry1] : 38 year old P0 LMP 11.25.23 presents for annual gyn visit. Pt feels well and has no complaints. She has normal menses. She denies intermenstrual bleeding, itching, burning, or abnormal discharge. Pt reports mother and maternal aunt both diagnosed with multiple myeloma this year.  [N] : Patient is not sexually active [PapSmeardate] : 12.21.23

## 2023-12-21 NOTE — END OF VISIT
[FreeTextEntry3] :  I, Rossy Monge, acted solely as a scribe for Dr. Eloisa Mckeon MD on this date 12/21/2023   All medical entries made by the scribe were at my, Dr. Eloisa Mckeon, direction and personally dictated by me on 12/21/2023 I have reviewed the chart and agree that the record accurately reflects my personal performance of the history, physical exam, assessment and plan. I have also personally directed, reviewed, and agreed with the chart.

## 2023-12-21 NOTE — REVIEW OF SYSTEMS
How Severe Is It?: moderate Is This A New Presentation, Or A Follow-Up?: Follow Up Accutane [Negative] : Psychiatric

## 2024-10-02 NOTE — ASU PATIENT PROFILE, ADULT - ATTEMPT TO OOB
Called and spoke to patient. Relayed message below. Patient has physical scheduled on 10/15/24.     Daria Gandhi, CRISTIAN 10/2/2024    
I changed it to the next dose up.this is the final/max dose and she should stay at this dose and please schedule a follow up visit if not already made.   Rhoda Redman MD   
Patient needs refill. [place] [facility] was contacted and there are no refills on file at the pharmacy.     Teena Platt RN on 9/30/2024 at 12:12 PM    
no

## 2024-11-30 ENCOUNTER — NON-APPOINTMENT (OUTPATIENT)
Age: 39
End: 2024-11-30

## 2024-12-10 ENCOUNTER — APPOINTMENT (OUTPATIENT)
Dept: DERMATOLOGY | Facility: CLINIC | Age: 39
End: 2024-12-10
Payer: COMMERCIAL

## 2024-12-10 DIAGNOSIS — L30.1 DYSHIDROSIS [POMPHOLYX]: ICD-10-CM

## 2024-12-10 DIAGNOSIS — L70.0 ACNE VULGARIS: ICD-10-CM

## 2024-12-10 DIAGNOSIS — L20.81 ATOPIC NEURODERMATITIS: ICD-10-CM

## 2024-12-10 PROCEDURE — 99204 OFFICE O/P NEW MOD 45 MIN: CPT

## 2024-12-10 RX ORDER — BETAMETHASONE DIPROPIONATE 0.5 MG/G
0.05 CREAM, AUGMENTED TOPICAL TWICE DAILY
Qty: 1 | Refills: 0 | Status: ACTIVE | COMMUNITY
Start: 2024-12-10 | End: 1900-01-01

## 2024-12-10 RX ORDER — MOMETASONE FUROATE 1 MG/G
0.1 CREAM TOPICAL
Qty: 1 | Refills: 3 | Status: ACTIVE | COMMUNITY
Start: 2024-12-10 | End: 1900-01-01

## 2024-12-31 ENCOUNTER — APPOINTMENT (OUTPATIENT)
Dept: OBGYN | Facility: CLINIC | Age: 39
End: 2024-12-31
Payer: COMMERCIAL

## 2024-12-31 VITALS
HEIGHT: 64 IN | DIASTOLIC BLOOD PRESSURE: 82 MMHG | HEART RATE: 83 BPM | SYSTOLIC BLOOD PRESSURE: 118 MMHG | BODY MASS INDEX: 28.85 KG/M2 | WEIGHT: 169 LBS

## 2024-12-31 DIAGNOSIS — Z01.419 ENCOUNTER FOR GYNECOLOGICAL EXAMINATION (GENERAL) (ROUTINE) W/OUT ABNORMAL FINDINGS: ICD-10-CM

## 2024-12-31 PROCEDURE — 96127 BRIEF EMOTIONAL/BEHAV ASSMT: CPT

## 2024-12-31 PROCEDURE — 99395 PREV VISIT EST AGE 18-39: CPT

## 2024-12-31 PROCEDURE — 99459 PELVIC EXAMINATION: CPT

## 2024-12-31 RX ORDER — CETIRIZINE HYDROCHLORIDE 10 MG/1
TABLET, FILM COATED ORAL
Refills: 0 | Status: ACTIVE | COMMUNITY

## 2025-01-02 LAB — HPV HIGH+LOW RISK DNA PNL CVX: NOT DETECTED

## 2025-01-04 LAB — CYTOLOGY CVX/VAG DOC THIN PREP: NORMAL

## 2025-02-03 ENCOUNTER — APPOINTMENT (OUTPATIENT)
Dept: DERMATOLOGY | Facility: CLINIC | Age: 40
End: 2025-02-03